# Patient Record
(demographics unavailable — no encounter records)

---

## 2025-04-22 NOTE — DISCUSSION/SUMMARY
[de-identified] : This patient has bilateral knee osteoarthritis. She is a candidate for TKA.  An extensive discussion was conducted on the natural history of the disease and the variety of surgical and non-surgical options available to the patient including, but not limited to non-steroidal anti-inflammatory medications, steroid injections, physical therapy, maintenance of ideal body weight, and reduction of activity.  Weight loss recommended.  Mobic prescribed. Today we performed bilateral knee intraarticular cortisone injection. The patient will schedule an appointment as needed.  Informed consent for bilateral knee injections was obtained. All risks, benefits and alternatives were discussed. These included but were not limited to bleeding, infection, and allergic reaction.  All questions were answered. A time out was performed. The bilateral knees were prepped and draped in sterile fashion. Using sterile technique, the bilateral knees were each injected with 40mg of Kenalog, 4cc of 1% lidocaine, 4cc of 0.25% marcaine using a 21-gauge needle. A sterile dressing was applied. Post injection instructions were reviewed. The patient tolerated the procedure well.

## 2025-04-22 NOTE — HISTORY OF PRESENT ILLNESS
[de-identified] : This is very nice 56-year-old female experiencing bilateral knee pain, which is severe in intensity.  She has known bilateral knee osteoarthritis.  The pain substantially limits activities of daily living. Walking tolerance is reduced.  She does not take NSAIDs.  She uses hyaluronic acid and cortisone injections in the past which worked well for her.    The patient denies any radiation of the pain to the feet and it is not associated with numbness, tingling, or weakness.

## 2025-04-22 NOTE — PHYSICAL EXAM
[de-identified] : Patient is well nourished, well-developed, in no acute distress, with appropriate mood and affect. The patient is oriented to time, place, and person. Respirations are even and unlabored. Gait evaluation does reveal a limp. There is no inguinal adenopathy. Bilateral limbs are well-perfused, without skin lesions, shows a grossly normal motor and sensory examination. The right knee motion is significantly reduced and does cause significant pain. The right knee moves from 0 to 115 degrees. The knee is stable within that range-of-motion to AP and ML stress. The alignment of the knee is 5 degrees varus. Muscle strength is normal. Pedal pulses are palpable. Hip examination was negative. The left knee motion is significantly reduced and does cause significant pain. The left knee moves from 0 to 115 degrees. The knee is stable within that range-of-motion to AP and ML stress. The alignment of the knee is 5 degrees varus. Muscle strength is normal. Pedal pulses are palpable. Hip examination was negative. [de-identified] : Long standing knee, AP knee, lateral knee, and patellar views of the bilateral knee were ordered and taken in the office and demonstrate degenerative joint disease of the knee with joint space narrowing, osteophyte formation, and subchondral sclerosis.

## 2025-05-08 NOTE — PHYSICAL EXAM
[General Appearance - Alert] : alert [General Appearance - Well Nourished] : well nourished [Oriented To Time, Place, And Person] : oriented to person, place, and time [Affect] : the affect was normal [Person] : oriented to person [Place] : oriented to place [Cranial Nerves Oculomotor (III)] : extraocular motion intact [Cranial Nerves Facial (VII)] : face symmetrical [Motor Tone] : muscle tone was normal in all four extremities [Motor Strength] : muscle strength was normal in all four extremities [Balance] : balance was intact [2+] : Biceps left 2+ [] : no respiratory distress [Abnormal Walk] : normal gait [Skin Color & Pigmentation] : normal skin color and pigmentation

## 2025-05-08 NOTE — HISTORY OF PRESENT ILLNESS
[FreeTextEntry1] : This is a case of a 56 yr right handed female with PMH of  pre dm, HTN, obesity, MADYSON presents today with headaches.  Pt has had a history of headaches since 20's.  She reports occasionally headaches.  In the past yr she noticed headaches have been worse and increased.  Headaches are now 1-2 times a week.    Headaches can last approx. 12 or more hrs.  Sometimes can wake up with headaches.  Located frontal.  Described as throbbing.  Denies n/v.  Has photo/phonophobia.  Denies any vision loss, doubled. blurred.  Denies extremity weakness or slurred speech.  Denies any numbness or tingling.  Treating with Tylenol or Excedrin migraine.   She works long days 16 hrs plus at times as a  and work has been more stressful.    Occupation:   Allergies: NKA

## 2025-05-08 NOTE — ASSESSMENT
[FreeTextEntry1] : This is a case of a 56 yr right handed female with PMH of  pre dm, HTN, obesity, MADYSON presents today with headaches.  Plan:     {      } Brain MRI    {      } trial rizatriptan 10 mg     {      } start mag 400 mg and b 2 400 mg    {      } f/u 3 months   Headache education provided: [] Stay well hydrated [] Limit excessive caffeine and alcohol intake [] Maintain good sleep hygiene [] Try to avoid triggers [] Practice good eating habits [] Avoid excessive use of over the counter pain medications, as they can cause medication overuse headaches  [] Keep a headache diary [] Relaxation techniques, biofeedback, massage therapy, acupunctures, and heating pads may be effective  The above plan was discussed with Danielle Grover in great detail. Danielle Grover verbalized understanding and agrees with plan as detailed above. Patient was provided education and counselling on current diagnosis/symptoms. She was advised to call our clinic at [phone] for any new or worsening symptoms, or with any questions or concerns. In case of acute onset of neurological symptoms or worsening presentation, patient was advised to present to nearest emergency room for further evaluation. Danielle Grover expressed understanding and all her questions/concerns were addressed.

## 2025-05-23 NOTE — HISTORY OF PRESENT ILLNESS
[FreeTextEntry1] : np spots on face, hair loss [de-identified] : Ms. JANE LANEJAMEYESCAMILLA  is a 56 year old F here for evaluation of below  #spots on face x months-yrs #hair thinning over time, asx  meds: olmesartan, metformin, asa, atorvastatin, risatriptan  - pt is postmenopausal no personal or family h/o skin cancer

## 2025-05-23 NOTE — PHYSICAL EXAM
[FreeTextEntry3] : diffusely decreased hair density, especially pronounced along central part with miniaturized hairs cheeks with SKs, lentigines

## 2025-05-23 NOTE — HISTORY OF PRESENT ILLNESS
[FreeTextEntry1] : np spots on face, hair loss [de-identified] : Ms. JANE LANEJAMEYESCAMILLA  is a 56 year old F here for evaluation of below  #spots on face x months-yrs #hair thinning over time, asx  meds: olmesartan, metformin, asa, atorvastatin, risatriptan  - pt is postmenopausal no personal or family h/o skin cancer

## 2025-05-23 NOTE — ASSESSMENT
[FreeTextEntry1] : AGA, progressing discussed nature, chronicity and unpredictable course Therapeutic options and their risks and benefits; along with multiple diagnostic possibilities were discussed at length; risks and benefits of further study were discussed start otc 5% minoxidil foam/soln daily, SED including burning sensation, initial hair shedding or shedding upon cessation, may take up to 9-12 months of consistent daily use to take effect -  on olmesartan - will need to monitor K levels closely if she would want to try spironolactone, discussed risk of life threatening hyperkalemia - other options include finasteride, SED including low risk of sexual dysfunction  SK lentigo face - Counseled about clinically and dermatoscopically benign lesions - No treatment indicated at this time - Counseled patient to notify us of any changes

## 2025-06-04 NOTE — HISTORY OF PRESENT ILLNESS
[de-identified] : This is a very nice  56 year old  female experiencing worsening pain in the bilateral knee which is severe in intensity and has been going on for at least 6 months now.  Known bilateral knee osteoarthritis. Gel injections and cortisone injections have helped in the past.  The pain substantially limits activities of daily living. Walking tolerance is reduced. Medication and activity modification have been minimally effective for a period lasting greater than three months in duration. Assistive devices and external support were not deemed by the patient to be helpful in improving their function. Due to the severity of osteoarthritis and level of pain, physical therapy is contraindicated. Pain and restriction of function are intolerable at this time. The patient denies any radiation of the pain to the feet and it is not associated with numbness, tingling, or weakness.

## 2025-06-04 NOTE — PHYSICAL EXAM
[de-identified] : Patient is well nourished, well-developed, in no acute distress, with appropriate mood and affect. The patient is oriented to time, place, and person. Respirations are even and unlabored. Gait evaluation does reveal a limp. There is no inguinal adenopathy. Bilateral limbs are well-perfused, without skin lesions, shows a grossly normal motor and sensory examination. The right knee motion is significantly reduced and does cause significant pain. The right knee moves from 0 to 115 degrees. The knee is stable within that range-of-motion to AP and ML stress. The alignment of the knee is 5 degrees varus. Muscle strength is normal. Pedal pulses are palpable. Hip examination was negative. The left knee motion is significantly reduced and does cause significant pain. The left knee moves from 0 to 115 degrees. The knee is stable within that range-of-motion to AP and ML stress. The alignment of the knee is 5 degrees varus. Muscle strength is normal. Pedal pulses are palpable. Hip examination was negative.

## 2025-06-04 NOTE — DISCUSSION/SUMMARY
[de-identified] : This patient has bilateral knee osteoarthritis. She is a candidate for TKA if she can bring her BMI<40 (currently 42). An extensive discussion was conducted on the natural history of the disease and the variety of surgical and non-surgical options available to the patient including, but not limited to non-steroidal anti-inflammatory medications, steroid injections, physical therapy, maintenance of ideal body weight, and reduction of activity. Weight loss recommended. Mobic prescribed. Will apply to insurance for HA auth. The patient will schedule an appointment as needed.

## 2025-07-21 NOTE — PHYSICAL EXAM
[General Appearance - Alert] : alert [General Appearance - Well Nourished] : well nourished [Oriented To Time, Place, And Person] : oriented to person, place, and time [Affect] : the affect was normal [Person] : oriented to person [Place] : oriented to place [Cranial Nerves Optic (II)] : visual acuity intact bilaterally,  visual fields full to confrontation, pupils equal round and reactive to light [Cranial Nerves Facial (VII)] : face symmetrical [Motor Tone] : muscle tone was normal in all four extremities [Abnormal Walk] : normal gait [Skin Color & Pigmentation] : normal skin color and pigmentation

## 2025-07-21 NOTE — ASSESSMENT
[FreeTextEntry1] : This is a case of a 56 yr right handed female with PMH of pre dm, HTN, obesity, MADYSON presents today with headaches.  Plan:  { \} Brain MRI- need imaging reports   { \}  rizatriptan 10 mg  { \} start mag 400 mg and b 2 400 mg  { \} f/u 6 months  Headache education provided: [] Stay well hydrated [] Limit excessive caffeine and alcohol intake [] Maintain good sleep hygiene [] Try to avoid triggers [] Practice good eating habits [] Avoid excessive use of over the counter pain medications, as they can cause medication overuse headaches [] Keep a headache diary [] Relaxation techniques, biofeedback, massage therapy, acupunctures, and heating pads may be effective  The above plan was discussed with Danielle Grover in great detail. Danielle Grover verbalized understanding and agrees with plan as detailed above. Patient was provided education and counselling on current diagnosis/symptoms. She was advised to call our clinic at [phone] for any new or worsening symptoms, or with any questions or concerns. In case of acute onset of neurological symptoms or worsening presentation, patient was advised to present to nearest emergency room for further evaluation. Danielle Grover expressed understanding and all her questions/concerns were addressed.

## 2025-07-21 NOTE — HISTORY OF PRESENT ILLNESS
[FreeTextEntry1] : This is a case of a 56 yr right handed female with PMH of pre dm, HTN, obesity, MADYSON presents today with headaches.  Pt is here for a follow up. Pt doing well.  She is still stressed with work but has been doing better controlling her stress.  Had MRI completed but does not have MRI reports on her. Headaches have improved.  She is using rizatriptan on onset with relief. Denies any new or worsening symptoms.   Interval May 8, 2025 Pt has had a history of headaches since 20's. She reports occasionally headaches. In the past yr she noticed headaches have been worse and increased. Headaches are now 1-2 times a week. Headaches can last approx. 12 or more hrs. Sometimes can wake up with headaches. Located frontal. Described as throbbing. Denies n/v. Has photo/phonophobia. Denies any vision loss, doubled. blurred. Denies extremity weakness or slurred speech. Denies any numbness or tingling. Treating with Tylenol or Excedrin migraine. She works long days 16 hrs plus at times as a  and work has been more stressful.   Occupation:  at [employer] Allergies: NKA